# Patient Record
Sex: FEMALE | Race: WHITE | URBAN - METROPOLITAN AREA
[De-identification: names, ages, dates, MRNs, and addresses within clinical notes are randomized per-mention and may not be internally consistent; named-entity substitution may affect disease eponyms.]

---

## 2017-11-16 ENCOUNTER — OFFICE VISIT (OUTPATIENT)
Dept: INTERNAL MEDICINE CLINIC | Age: 32
End: 2017-11-16

## 2017-11-16 VITALS
DIASTOLIC BLOOD PRESSURE: 63 MMHG | HEART RATE: 71 BPM | SYSTOLIC BLOOD PRESSURE: 111 MMHG | HEIGHT: 60 IN | OXYGEN SATURATION: 97 % | BODY MASS INDEX: 26.93 KG/M2 | TEMPERATURE: 98.2 F | RESPIRATION RATE: 18 BRPM | WEIGHT: 137.2 LBS

## 2017-11-16 DIAGNOSIS — Z83.49 FAMILY HISTORY OF THYROID DISEASE IN MOTHER: ICD-10-CM

## 2017-11-16 DIAGNOSIS — R63.5 WEIGHT GAIN: ICD-10-CM

## 2017-11-16 DIAGNOSIS — E55.9 VITAMIN D DEFICIENCY: ICD-10-CM

## 2017-11-16 DIAGNOSIS — N92.6 IRREGULAR MENSES: ICD-10-CM

## 2017-11-16 DIAGNOSIS — K58.1 IRRITABLE BOWEL SYNDROME WITH CONSTIPATION: ICD-10-CM

## 2017-11-16 DIAGNOSIS — Z00.00 ROUTINE MEDICAL EXAM: Primary | ICD-10-CM

## 2017-11-16 DIAGNOSIS — Z86.2 HISTORY OF ANEMIA: ICD-10-CM

## 2017-11-16 DIAGNOSIS — K21.9 GASTROESOPHAGEAL REFLUX DISEASE WITHOUT ESOPHAGITIS: ICD-10-CM

## 2017-11-16 RX ORDER — TRAMADOL HYDROCHLORIDE 50 MG/1
TABLET ORAL
COMMUNITY
Start: 2017-11-13

## 2017-11-16 RX ORDER — CEPHALEXIN 500 MG/1
CAPSULE ORAL
COMMUNITY
Start: 2017-11-13

## 2017-11-16 RX ORDER — DICYCLOMINE HYDROCHLORIDE 10 MG/1
10 CAPSULE ORAL 3 TIMES DAILY
Qty: 30 CAP | Refills: 1 | Status: SHIPPED | OUTPATIENT
Start: 2017-11-16

## 2017-11-16 RX ORDER — IBUPROFEN 800 MG/1
TABLET ORAL
COMMUNITY
Start: 2017-11-13

## 2017-11-16 NOTE — PATIENT INSTRUCTIONS
Increase water intake to 6-8 glasses a day, add 30 grams of fiber to diet- fiber gummies, whole grain bread or cereal, oatmeal. Use colace stool softener 1-2 capsules at bedtime routinely if needed. If no BM for 2 days, use a senna laxative. pepcid complete twice a day as needed. [place] diet. Calcium 1200mg daily. Vitamin D3 2,000 iu daily. Most from foods.

## 2017-11-16 NOTE — MR AVS SNAPSHOT
Visit Information Date & Time Provider Department Dept. Phone Encounter #  
 11/16/2017  8:30 AM Diamond Copeland, [address] [id number] Follow-up Instructions Return for follow up pending labs. Aquilino Blackburn Upcoming Health Maintenance Date Due DTaP/Tdap/Td series (1 - Tdap) 6/3/2006 PAP AKA CERVICAL CYTOLOGY 6/3/2006 Influenza Age 5 to Adult 8/1/2017 Allergies as of 11/16/2017  Review Complete On: 11/16/2017 By: Diamond Copeland MD  
 No Known Allergies Current Immunizations  Reviewed on 11/16/2017 No immunizations on file. Reviewed by Diamond Copeland MD on 11/16/2017 at  9:31 AM  
You Were Diagnosed With   
  
 Codes Comments Routine medical exam    -  Primary ICD-10-CM: Z00.00 ICD-9-CM: V70.0 Vitamin D deficiency     ICD-10-CM: E55.9 ICD-9-CM: 268.9 Irregular menses     ICD-10-CM: N92.6 ICD-9-CM: 626.4 Family history of thyroid disease in mother     ICD-10-CM: Z80.51 
ICD-9-CM: V18.19 Weight gain     ICD-10-CM: R63.5 ICD-9-CM: 783.1 History of anemia     ICD-10-CM: Z86.2 ICD-9-CM: V12.3 Irritable bowel syndrome with constipation     ICD-10-CM: K58.1 ICD-9-CM: 993.4 Vitals BP Pulse Temp Resp Height(growth percentile) Weight(growth percentile) 111/63 (BP 1 Location: Left arm, BP Patient Position: Sitting) 71 98.2 °F (36.8 °C) (Oral) 18 5' (1.524 m) 137 lb 3.2 oz (62.2 kg) LMP SpO2 BMI OB Status Smoking Status 10/08/2017 (Approximate) 97% 26.8 kg/m2 Having regular periods Never Smoker BMI and BSA Data Body Mass Index Body Surface Area  
 26.8 kg/m 2 1.62 m 2 Preferred Pharmacy Pharmacy Name Phone [id number] [phone] Your Updated Medication List  
  
   
This list is accurate as of: 11/16/17  9:36 AM.  Always use your most recent med list.  
  
  
  
  
 cephALEXin 500 mg capsule Commonly known as:  KEFLEX  
  
 dicyclomine 10 mg capsule Commonly known as:  BENTYL Take 1 Cap by mouth three (3) times daily. As needed for abdominal cramping. ibuprofen 800 mg tablet Commonly known as:  MOTRIN  
  
 traMADol 50 mg tablet Commonly known as:  ULTRAM  
  
  
  
  
Prescriptions Sent to Pharmacy Refills  
 dicyclomine (BENTYL) 10 mg capsule 1 Sig: Take 1 Cap by mouth three (3) times daily. As needed for abdominal cramping. Class: Normal  
 Pharmacy: [id number] - [address]  #: [phone] Route: Oral  
  
Follow-up Instructions Return for follow up pending labs. Anselmo Madera To-Do List   
 11/16/2017 Lab:  CBC W/O DIFF   
  
 11/16/2017 Lab:  CORTISOL, SALIVA   
  
 11/16/2017 Lab:  DHEA   
  
 11/16/2017 Lab:  ESTRADIOL   
  
 11/16/2017 Lab:  FERRITIN   
  
 11/16/2017 Lab:  [facility] AND [address]   
  
 11/16/2017 Lab:  INSULIN-LIKE GROWTH FACTOR 1   
  
 11/16/2017 Lab:  IRON PROFILE   
  
 11/16/2017 Lab:  LIPID PANEL   
  
 11/16/2017 Lab:  METABOLIC PANEL, COMPREHENSIVE   
  
 11/16/2017 Lab:  PROGESTERONE   
  
 11/16/2017 Lab:  T3 TOTAL   
  
 11/16/2017 Lab:  T3, FREE   
  
 11/16/2017 Lab:  T3, REVERSE   
  
 11/16/2017 Lab:  T4 (THYROXINE)   
  
 11/16/2017 Lab:  T4, FREE   
  
 11/16/2017 Lab:  TESTOSTERONE, FREE & TOTAL   
  
 11/16/2017 Lab:  THYROID ANTIBODY PANEL   
  
 11/16/2017 Lab:  TSH 3RD GENERATION   
  
 11/16/2017 Lab:  VITAMIN B12   
  
 11/16/2017 Lab:  VITAMIN D, 25 HYDROXY Patient Instructions Increase water intake to 6-8 glasses a day, add 30 grams of fiber to diet- fiber gummies, whole grain bread or cereal, oatmeal. Use colace stool softener 1-2 capsules at bedtime routinely if needed. If no BM for 2 days, use a senna laxative. pepcid complete twice a day as needed. [place] diet. Calcium 1200mg daily. Vitamin D3 2,000 iu daily. Most from foods. Introducing [facility]! Julee Lam introduces PicLyf patient portal. Now you can access parts of your medical record, email your doctor's office, and request medication refills online. 1. In your internet browser, go to [email or web address] 2. Click on the First Time User? Click Here link in the Sign In box. You will see the New Member Sign Up page. 3. Enter your PicLyf Access Code exactly as it appears below. You will not need to use this code after youve completed the sign-up process. If you do not sign up before the expiration date, you must request a new code. · PicLyf Access Code: [facility] Expires: 2/14/2018  9:35 AM 
 
4. Enter the last four digits of your Social Security Number (xxxx) and Date of Birth (mm/dd/yyyy) as indicated and click Submit. You will be taken to the next sign-up page. 5. Create a PicLyf ID. This will be your PicLyf login ID and cannot be changed, so think of one that is secure and easy to remember. 6. Create a PicLyf password. You can change your password at any time. 7. Enter your Password Reset Question and Answer. This can be used at a later time if you forget your password. 8. Enter your e-mail address. You will receive e-mail notification when new information is available in [address] 9. Click Sign Up. You can now view and download portions of your medical record. 10. Click the Download Summary menu link to download a portable copy of your medical information. If you have questions, please visit the Frequently Asked Questions section of the PicLyf website. Remember, PicLyf is NOT to be used for urgent needs. For medical emergencies, dial 911. Now available from your iPhone and Android! Please provide this summary of care documentation to your next provider. Your primary care clinician is listed as Joe Mcclelland. If you have any questions after today's visit, please call [phone].

## 2017-11-16 NOTE — LETTER
11/29/2017 10:48 AM 
 
Ms. Yamini Snyder [facility] [address] Dear Luz Merritt: 
 
Please find your most recent results below. Resulted Orders METABOLIC PANEL, COMPREHENSIVE Result Value Ref Range Glucose 81 65 - 99 mg/dL BUN 19 6 - 20 mg/dL Creatinine 0.78 0.57 - 1.00 mg/dL GFR est non- >59 mL/min/1.73 GFR est  >59 mL/min/1.73  
 BUN/Creatinine ratio 24 (H) 9 - 23 Sodium 139 134 - 144 mmol/L Potassium 4.7 3.5 - 5.2 mmol/L Chloride 101 96 - 106 mmol/L  
 CO2 23 18 - 29 mmol/L Calcium 8.9 8.7 - 10.2 mg/dL Protein, total 6.8 6.0 - 8.5 g/dL Albumin 4.4 3.5 - 5.5 g/dL GLOBULIN, TOTAL 2.4 1.5 - 4.5 g/dL A-G Ratio 1.8 1.2 - 2.2 Bilirubin, total 0.3 0.0 - 1.2 mg/dL Alk. phosphatase 41 39 - 117 IU/L  
 AST (SGOT) 22 0 - 40 IU/L  
 ALT (SGPT) 23 0 - 32 IU/L Narrative Performed at:  [address]  [id number] : Gina Carson MD, Phone:  [phone] CBC W/O DIFF Result Value Ref Range WBC 8.1 3.4 - 10.8 x10E3/uL  
 RBC 3.88 3.77 - 5.28 x10E6/uL HGB 12.2 11.1 - 15.9 g/dL Comment: **Effective December 4, 2017 the reference interval** 
  for Hemoglobin MALES only will be changing to: Males 13-15 years: 12.6 - 17.7 Males   >15 years: 13.0 - 17.7 HCT 36.2 34.0 - 46.6 % MCV 93 79 - 97 fL  
 MCH 31.4 26.6 - 33.0 pg  
 MCHC 33.7 31.5 - 35.7 g/dL  
 RDW 13.1 12.3 - 15.4 % PLATELET 180 941 - 686 x10E3/uL Narrative Performed at:  [facility] [address] [place] [address]  [id number] : Gina Carson MD, Phone:  [phone] LIPID PANEL Result Value Ref Range Cholesterol, total 140 100 - 199 mg/dL Triglyceride 68 0 - 149 mg/dL HDL Cholesterol 68 >39 mg/dL VLDL, calculated 14 5 - 40 mg/dL LDL, calculated 58 0 - 99 mg/dL Narrative Performed at:  [address]  [id number] : Jeannette Polo MD, Phone:  [phone] IRON PROFILE Result Value Ref Range TIBC 260 250 - 450 ug/dL UIBC 138 131 - 425 ug/dL Iron 122 27 - 159 ug/dL Iron % saturation 47 15 - 55 % Narrative Performed at:  [address]  [id number] : Jeannette Polo MD, Phone:  [phone] [facility] AND LH Result Value Ref Range Luteinizing hormone 7.4 mIU/mL Comment:  
                       Adult Female: Follicular phase      2.4 -  12.6 Ovulation phase      14.0 -  95.6 Luteal phase          1.0 -  11.4 Postmenopausal        7.7 -  58.5 FSH 3.4 mIU/mL Comment:  
                       Adult Female: Follicular phase      3.5 -  12.5 Ovulation phase       4.7 -  21.5 Luteal phase          1.7 -   7.7 Postmenopausal       25.8 - 134.8 Narrative Performed at:  [address]  [id number] : Jeannette Polo MD, Phone:  [phone] TESTOSTERONE, FREE & TOTAL Result Value Ref Range Testosterone 28 8 - 48 ng/dL Free testosterone (Direct) 1.1 0.0 - 4.2 pg/mL Narrative Performed at:  [address]  [id number] : Jeannette Polo MD, Phone:  [phone] CORTISOL, SALIVA Result Value Ref Range Salivary Cortisol, MS 0.056 ug/dL Comment:  
   Reference Range: 
Children and Adults: 
8:00a. m.:   0.025 - 0.600 Noon:      <0.010 - 0.330 
4:00p.m.:   0.010 - 0.200 Midnight:  <0.010 - 0.090 Narrative Performed at:  [address] [place] [address] Endocrinology [address] [place] [address]  [de-identified] : Lorna Stone MD, Phone:  [phone] T4, FREE Result Value Ref Range T4, Free 1.66 0.82 - 1.77 ng/dL Narrative Performed at:  [address]  [id number] : Jolynn Zelaya MD, Phone:  [phone] DHEA Result Value Ref Range Dehydroepiandrosterone (DHEA) 161 31 - 701 ng/dL Comment:  
                                    Age 1 -  5 years    0 -  67 
                              6 -  7 years    0 - 110 
                              8 - 10 years    0 - 185 
                             11 - 12 years    0 - 201 
                             13 - 14 years    0 - 318 
                             15 - 16 years   44 - 481 
                             16 - 19 years   36 - 491 
                                 >19 years   32 - 701 This test was developed and its performance characteristics 
determined by [employer]. It has not been cleared or approved 
by the Food and Drug Administration. Narrative Performed at:  [address]  [id number] : Jolynn Zelaya MD, Phone:  [phone] TSH 3RD GENERATION Result Value Ref Range TSH 1.650 0.450 - 4.500 uIU/mL Narrative Performed at:  [address]  [id number] : Jolynn Zelaya MD, Phone:  [phone] ESTRADIOL Result Value Ref Range Estradiol 145.7 pg/mL Comment:  
                       Adult Female: Follicular phase   45.0 -   166.0 Ovulation phase    85.8 -   498.0 Luteal phase       43.8 -   211.0 Postmenopausal     <6.0 -    54.7 Pregnancy 1st trimester     215.0 - >4300.0 Girls (1-10 years)    6.0 -    27.0 Roche ECLIA methodology Narrative Performed at:  [address]  [id number] : Jimena Sims MD, Phone:  [phone] INSULIN-LIKE GROWTH FACTOR 1 Result Value Ref Range Insulin-Like Growth Factor I 213 73 - 243 ng/mL Narrative Performed at:  [address]  [id number] : Jimena Sims MD, Phone:  [phone] T3, REVERSE Result Value Ref Range REVERSE T3, SERUM 24.4 (H) 9.2 - 24.1 ng/dL Narrative Performed at:  [address]  [id number] : Jimena Sims MD, Phone:  [phone] VITAMIN D, 25 HYDROXY Result Value Ref Range VITAMIN D, 25-HYDROXY 37.6 30.0 - 100.0 ng/mL Comment:  
   Vitamin D deficiency has been defined by the [address] [place] [address] practice guideline as a 
level of serum 25-OH vitamin D less than 20 ng/mL (1,2). The Endocrine Society went on to further define vitamin D 
insufficiency as a level between 21 and 29 ng/mL (2). 1. IOM ([place] of Medicine). 2010. Dietary reference 
   intakes for calcium and D. [facility]: The 
   [employer]. 2. Simin MF, Carlito NC, Robbie BECERRIL, et al. 
   Evaluation, treatment, and prevention of vitamin D 
   deficiency: an Endocrine Society clinical practice 
   guideline. JCEM. 2011 Jul; 96(7):1911-30. Narrative Performed at:  [address]  [id number] : Jimena Sims MD, Phone:  [phone] T4 Result Value Ref Range T4, Total 8.0 4.5 - 12.0 ug/dL Narrative Performed at:  [address]  [id number] : iJmena Sims MD, Phone:  [phone] T3 TOTAL Result Value Ref Range T3, total 86 71 - 180 ng/dL Narrative Performed at:  [address]  [id number] : Teresa Zamarripa MD, Phone:  [phone] THYROID ANTIBODY PANEL Result Value Ref Range Thyroid peroxidase Ab 7 0 - 34 IU/mL Thyroglobulin Ab <1.0 0.0 - 0.9 IU/mL Comment:  
   Thyroglobulin Antibody measured by [facility] Narrative Performed at:  [address]  [id number] : Teresa Zamarripa MD, Phone:  [phone] VITAMIN B12 Result Value Ref Range Vitamin B12 902 211 - 946 pg/mL Comment: **Effective December 4, 2017 the reference interval** 
  for Vitamin B12 will be changing to: 232-1245 pg/mL. Narrative Performed at:  [address]  [id number] : Teresa Zamarripa MD, Phone:  [phone] PROGESTERONE Result Value Ref Range Progesterone 7.9 ng/mL Comment:  
                        Follicular phase       0.1 -   0.9 Luteal phase           1.8 -  23.9 Ovulation phase        0.1 -  12.0 Pregnant First trimester    11.0 -  44.3 Second trimester   25.4 -  83.3 Third trimester    58.7 - 214.0 Postmenopausal         0.0 -   0.1 Narrative Performed at:  [address]  [id number] : Teresa Zamarripa MD, Phone:  [phone] FERRITIN Result Value Ref Range Ferritin 96 15 - 150 ng/mL Narrative Performed at:  [address]  [id number] : Teresa Zamarripa MD, Phone:  [phone] T3, FREE Result Value Ref Range Triiodothyronine (T3), free 2.7 2.0 - 4.4 pg/mL Narrative Performed at:  [address]  [id number] : Teresa Zamarripa MD, Phone:  [phone] RECOMMENDATIONS: 
 Please call me if you have any questions: [phone] Sincerely, 
 
 
Starr Barney MD

## 2017-11-16 NOTE — PROGRESS NOTES
HPI: Pablo Dyer is a 28 y.o. female presents to establish. Cut 2nd and 3rd digits on left hand cutting food. No sutures. Given keflex. Changing dressing once a day. Took tramadol for pain. Td or Tdap. Reports has been healthy. Problems with constipation. Some food intolerance. Will get bloating, sharp abdominal pain. No prior antispasmodics. Has tried FODMAPS. Stopped dairy. No family history of IBD or colon cancer. No weight loss. Had upper abdominal pain in August. Seen in ED in [place] and diagnosed with gastritis. Prior h.pylori ab- breath test, negative. No nausea or vomiting. Is on meal plans (40-30-30) and weight training. Not much cardio. Working with  long distance. No significant weight loss. No change in 2-3 years. 200# in high school. Had pap Feb 2017, normal. No abnormal pap. Menses irregular at times. No family history of BRCA.          ROS:  Constitutional: negative for fevers, chills, anorexia, weight loss  Eyes:   negative for visual disturbance, irritation  ENT:   negative for tinnitus,sore throat,nasal congestion,ear pain,  Respiratory:  negative for cough, hemoptysis, dyspnea,wheezing  CV:   negative for chest pain, palpitations, lower extremity edema  GI:   negative for nausea, vomiting, diarrhea, melena, positive for intermittent abdominal pain, constipation. Endo:               negative for polyuria,polydipsia,polyphagia,heat intolerance  Genitourinary: negative for frequency, dysuria, hematuria  Musculoskel: negative for myalgias, back pain, muscle weakness, joint pain  Neurological:  negative for headaches, dizziness, gait problems, numbness  Behavl/Psych: negative for feelings of anxiety, depression, mood changes    History reviewed. No pertinent past medical history. History reviewed. No pertinent surgical history.   Social History     Social History    Marital status:      Spouse name: N/A    Number of children: N/A    Years of education: N/A     Social History Main Topics    Smoking status: Never Smoker    Smokeless tobacco: Never Used    Alcohol use 0.6 oz/week     1 Cans of beer per week      Comment: Once every 2 months    Drug use: No    Sexual activity: Yes     Partners: Male     Birth control/ protection: None     Other Topics Concern    None     Social History Narrative    None     Family History   Problem Relation Age of Onset    Thyroid Disease Mother     Hypertension Mother     Hypertension Father     Thyroid Disease Father      Current Outpatient Prescriptions   Medication Sig Dispense Refill    cephALEXin (KEFLEX) 500 mg capsule       ibuprofen (MOTRIN) 800 mg tablet       traMADol (ULTRAM) 50 mg tablet       dicyclomine (BENTYL) 10 mg capsule Take 1 Cap by mouth three (3) times daily. As needed for abdominal cramping. 30 Cap 1     No Known Allergies      Physical exam:  Visit Vitals    /63 (BP 1 Location: Left arm, BP Patient Position: Sitting)    Pulse 71    Temp 98.2 °F (36.8 °C) (Oral)    Resp 18    Ht 5' (1.524 m)    Wt 137 lb 3.2 oz (62.2 kg)    LMP 10/08/2017 (Approximate)    SpO2 97%    BMI 26.8 kg/m2     General appearance - alert, well appearing, and in no distress  HEENT- PERLL,normal conjunctiva, TM normal bilaterally, hearing grossly normal, normal nasal turbinates, no sinus tenderness, mucous membranes moist, pharynx normal without lesions  Neck - supple, no significant adenopathy   Pulm- clear to auscultation, no wheezes, rales or rhonchi  CV- normal rate, regular rhythm, normal S1, S2, no murmurs   Abdomen - soft, nontender, nondistended, no masses or organomegaly  Extrem-peripheral pulses normal, no pedal edema  Skin-bandaged 2nd and 3rd digits left hand. Neuro -alert, oriented,nonfocal      No results found for this or any previous visit. Assessment/Plan:    1. Routine medical exam-brings in a request for labs from naturopathic practitioner.   Recommend heart healthy diet and regular cardiovascular exercise. - METABOLIC PANEL, COMPREHENSIVE; Future  - LIPID PANEL; Future  - VITAMIN D, 25 HYDROXY; Future  - CBC W/O DIFF; Future  - TSH 3RD GENERATION; Future  - T3 TOTAL; Future  - T4, FREE; Future  - T3, FREE; Future  - REVERS T3; Future  - T4 (THYROXINE); Future  - THYROID ANTIBODY PANEL; Future  - CORTISOL, SALIVA; Future  - DHEA; Future  - VITAMIN B12; Future  - FSH AND LH; Future  - TESTOSTERONE, FREE & TOTAL; Future  - ESTRADIOL; Future  - PROGESTERONE; Future  - INSULIN-LIKE GROWTH FACTOR 1; Future  - FERRITIN; Future  - IRON PROFILE; Future    2. Vitamin D deficiency    - VITAMIN D, 25 HYDROXY; Future    3. Irregular menses    - FSH AND LH; Future  - TESTOSTERONE, FREE & TOTAL; Future  - ESTRADIOL; Future  - PROGESTERONE; Future    4. Family history of thyroid disease in mother    - TSH 3RD GENERATION; Future  - T3 TOTAL; Future  - T4, FREE; Future  - T3, FREE; Future  - REVERS T3; Future  - T4 (THYROXINE); Future  - THYROID ANTIBODY PANEL; Future    5. Weight gain    - CORTISOL, SALIVA; Future  - DHEA; Future  - INSULIN-LIKE GROWTH FACTOR 1; Future    6. History of anemia    - CBC W/O DIFF; Future  - VITAMIN B12; Future  - FERRITIN; Future  - IRON PROFILE; Future    7. Irritable bowel syndrome with constipation-discussed dietary monitoring. Increase water intake to 6-8 glasses a day, add 30 grams of fiber to diet- fiber gummies, whole grain bread or cereal, oatmeal. Use colace stool softener 1-2 capsules at bedtime routinely if needed. If no BM for 2 days, use a senna laxative. 8. GERD- pepcid complete twice a day as needed. [place] diet. Follow-up Disposition:  Return for follow up pending labs.   Pedrito Shin MD

## 2017-11-16 NOTE — PROGRESS NOTES
Chief Complaint   Patient presents with    Establish Care     Pt nonfasting     Visit Vitals    /63 (BP 1 Location: Left arm, BP Patient Position: Sitting)    Pulse 71    Temp 98.2 °F (36.8 °C) (Oral)    Resp 18    Ht 5' (1.524 m)    Wt 137 lb 3.2 oz (62.2 kg)    LMP 10/08/2017 (Approximate)    SpO2 97%    BMI 26.8 kg/m2       Reviewed record In preparation for visit and have obtained necessary documentation    1. Have you been to the ER, urgent care clinic since your last visit? Hospitalized since your last visit? Yes 11/13/2017 [facility] (finger injury)    2. Have you seen or consulted any other health care providers outside of the [address] [place] [address] since your last visit? Include any pap smears or colon screening. NO    Patient does not have advance directive on file and has received paperwork.

## 2017-11-19 PROBLEM — K58.1 IRRITABLE BOWEL SYNDROME WITH CONSTIPATION: Status: ACTIVE | Noted: 2017-11-19

## 2017-11-19 PROBLEM — K21.9 GASTROESOPHAGEAL REFLUX DISEASE WITHOUT ESOPHAGITIS: Status: ACTIVE | Noted: 2017-11-19

## 2017-11-19 PROBLEM — N92.6 IRREGULAR MENSES: Status: ACTIVE | Noted: 2017-11-19

## 2017-11-22 LAB
25(OH)D3+25(OH)D2 SERPL-MCNC: 37.6 NG/ML (ref 30–100)
ALBUMIN SERPL-MCNC: 4.4 G/DL (ref 3.5–5.5)
ALBUMIN/GLOB SERPL: 1.8 {RATIO} (ref 1.2–2.2)
ALP SERPL-CCNC: 41 IU/L (ref 39–117)
ALT SERPL-CCNC: 23 IU/L (ref 0–32)
AST SERPL-CCNC: 22 IU/L (ref 0–40)
BILIRUB SERPL-MCNC: 0.3 MG/DL (ref 0–1.2)
BUN SERPL-MCNC: 19 MG/DL (ref 6–20)
BUN/CREAT SERPL: 24 (ref 9–23)
CALCIUM SERPL-MCNC: 8.9 MG/DL (ref 8.7–10.2)
CHLORIDE SERPL-SCNC: 101 MMOL/L (ref 96–106)
CHOLEST SERPL-MCNC: 140 MG/DL (ref 100–199)
CO2 SERPL-SCNC: 23 MMOL/L (ref 18–29)
CORTIS SAL-MCNC: 0.06 UG/DL
CREAT SERPL-MCNC: 0.78 MG/DL (ref 0.57–1)
DHEA SERPL-MCNC: 161 NG/DL (ref 31–701)
ERYTHROCYTE [DISTWIDTH] IN BLOOD BY AUTOMATED COUNT: 13.1 % (ref 12.3–15.4)
ESTRADIOL SERPL-MCNC: 145.7 PG/ML
FERRITIN SERPL-MCNC: 96 NG/ML (ref 15–150)
FSH SERPL-ACNC: 3.4 MIU/ML
GFR SERPLBLD CREATININE-BSD FMLA CKD-EPI: 101 ML/MIN/1.73
GFR SERPLBLD CREATININE-BSD FMLA CKD-EPI: 116 ML/MIN/1.73
GLOBULIN SER CALC-MCNC: 2.4 G/DL (ref 1.5–4.5)
GLUCOSE SERPL-MCNC: 81 MG/DL (ref 65–99)
HCT VFR BLD AUTO: 36.2 % (ref 34–46.6)
HDLC SERPL-MCNC: 68 MG/DL
HGB BLD-MCNC: 12.2 G/DL (ref 11.1–15.9)
IGF-I SERPL-MCNC: 213 NG/ML (ref 73–243)
IRON SATN MFR SERPL: 47 % (ref 15–55)
IRON SERPL-MCNC: 122 UG/DL (ref 27–159)
LDLC SERPL CALC-MCNC: 58 MG/DL (ref 0–99)
LH SERPL-ACNC: 7.4 MIU/ML
MCH RBC QN AUTO: 31.4 PG (ref 26.6–33)
MCHC RBC AUTO-ENTMCNC: 33.7 G/DL (ref 31.5–35.7)
MCV RBC AUTO: 93 FL (ref 79–97)
PLATELET # BLD AUTO: 282 X10E3/UL (ref 150–379)
POTASSIUM SERPL-SCNC: 4.7 MMOL/L (ref 3.5–5.2)
PROGEST SERPL-MCNC: 7.9 NG/ML
PROT SERPL-MCNC: 6.8 G/DL (ref 6–8.5)
RBC # BLD AUTO: 3.88 X10E6/UL (ref 3.77–5.28)
SODIUM SERPL-SCNC: 139 MMOL/L (ref 134–144)
T3 SERPL-MCNC: 86 NG/DL (ref 71–180)
T3FREE SERPL-MCNC: 2.7 PG/ML (ref 2–4.4)
T3REVERSE SERPL-MCNC: 24.4 NG/DL (ref 9.2–24.1)
T4 FREE SERPL-MCNC: 1.66 NG/DL (ref 0.82–1.77)
T4 SERPL-MCNC: 8 UG/DL (ref 4.5–12)
TESTOST FREE SERPL-MCNC: 1.1 PG/ML (ref 0–4.2)
TESTOST SERPL-MCNC: 28 NG/DL (ref 8–48)
THYROGLOB AB SERPL-ACNC: <1 IU/ML (ref 0–0.9)
THYROPEROXIDASE AB SERPL-ACNC: 7 IU/ML (ref 0–34)
TIBC SERPL-MCNC: 260 UG/DL (ref 250–450)
TRIGL SERPL-MCNC: 68 MG/DL (ref 0–149)
TSH SERPL DL<=0.005 MIU/L-ACNC: 1.65 UIU/ML (ref 0.45–4.5)
UIBC SERPL-MCNC: 138 UG/DL (ref 131–425)
VIT B12 SERPL-MCNC: 902 PG/ML (ref 211–946)
VLDLC SERPL CALC-MCNC: 14 MG/DL (ref 5–40)
WBC # BLD AUTO: 8.1 X10E3/UL (ref 3.4–10.8)

## 2022-03-19 PROBLEM — K21.9 GASTROESOPHAGEAL REFLUX DISEASE WITHOUT ESOPHAGITIS: Status: ACTIVE | Noted: 2017-11-19

## 2022-03-19 PROBLEM — N92.6 IRREGULAR MENSES: Status: ACTIVE | Noted: 2017-11-19

## 2022-03-19 PROBLEM — K58.1 IRRITABLE BOWEL SYNDROME WITH CONSTIPATION: Status: ACTIVE | Noted: 2017-11-19

## 2023-05-23 RX ORDER — DICYCLOMINE HYDROCHLORIDE 10 MG/1
10 CAPSULE ORAL 3 TIMES DAILY
COMMUNITY
Start: 2017-11-16

## 2023-05-23 RX ORDER — CEPHALEXIN 500 MG/1
CAPSULE ORAL
COMMUNITY
Start: 2017-11-13

## 2023-05-23 RX ORDER — IBUPROFEN 800 MG/1
TABLET ORAL
COMMUNITY
Start: 2017-11-13

## 2023-05-23 RX ORDER — TRAMADOL HYDROCHLORIDE 50 MG/1
TABLET ORAL
COMMUNITY
Start: 2017-11-13